# Patient Record
Sex: FEMALE | Race: BLACK OR AFRICAN AMERICAN | Employment: OTHER | URBAN - METROPOLITAN AREA
[De-identification: names, ages, dates, MRNs, and addresses within clinical notes are randomized per-mention and may not be internally consistent; named-entity substitution may affect disease eponyms.]

---

## 2018-12-13 ENCOUNTER — OFFICE VISIT (OUTPATIENT)
Dept: CARDIOLOGY CLINIC | Age: 78
End: 2018-12-13

## 2018-12-13 VITALS
BODY MASS INDEX: 23.56 KG/M2 | HEART RATE: 64 BPM | WEIGHT: 120 LBS | SYSTOLIC BLOOD PRESSURE: 120 MMHG | HEIGHT: 60 IN | DIASTOLIC BLOOD PRESSURE: 60 MMHG | OXYGEN SATURATION: 99 %

## 2018-12-13 DIAGNOSIS — I48.91 ATRIAL FIBRILLATION, UNSPECIFIED TYPE (HCC): ICD-10-CM

## 2018-12-13 DIAGNOSIS — R01.1 HEART MURMUR: Primary | ICD-10-CM

## 2018-12-13 RX ORDER — FUROSEMIDE 20 MG/1
20 TABLET ORAL
COMMUNITY

## 2018-12-13 RX ORDER — METOPROLOL SUCCINATE 100 MG/1
50 TABLET, EXTENDED RELEASE ORAL DAILY
COMMUNITY

## 2018-12-13 RX ORDER — CEFTRIAXONE SODIUM 2 G/1
2 INJECTION, POWDER, FOR SOLUTION INTRAVENOUS EVERY 24 HOURS
COMMUNITY
End: 2019-01-25

## 2018-12-13 NOTE — PROGRESS NOTES
Lala Elias    Chief Complaint   Patient presents with   [facility] New Patient     referred for A-fib and heart murmur - no cardiac complaints       HPI    Lala Elias is a 69 y.o. AAF with recent hospitalization, here to establish cardiovascular care. As you know, Ms. Livier Alcantar used to live alone in the [place] area. She was active and thought to be doing well. Her daughter came to visit her just before Thanksgiving with plans to bring her back to [facility] for a few weeks. Patient had been complaining of a cough so she went to her primary care provider who did a routine workup. Patient was incidentally found to be in atrial fibrillation with RVR 160s, had severe MR, and sepsis/ bacteremia. She was admitted to [facility] and apparently converted to NSR with Metoprolol. She had a 2D echocardiogram that showed severe MR due to prolapsed posterior leaflet (which was new when compared to prior echo from ~2014. Due to the bacteremia, patient had a JOSE (I do not have this report at the time of my evaluation) but daughter says there was no sign of vegetation and patient was in NSR so no cardioversion was attempted. Her LVEF was normal but she did have AECHF/ HFpEF and greatly improved fluid status with IV Lasix. She was discharged on BB, Xarelto, Lasix 3x per week, and Rocephin per PICC line. They have a home care nurse who helps with this and her Abx therapy will complete on 12/17/19 I believe. I independently obtained and reviewed records to get history above. Patient has h/o dementia which daughter says is mild. Patient is overall much improved, has no complaints, but hasnt been as active as they weren't sure how much to push her. She literally just came from [place] a few days ago so they are just getting her established with physicians in the area. Patient will live with her daughter here. No past medical history on file. - AF, severe MR, diastolic CHF, dementia    No past surgical history on file.     Current Outpatient Medications   Medication Sig Dispense Refill    cefTRIAXone (ROCEPHIN) 2 gram solr 2 g by IntraVENous route every twenty-four (24) hours.  furosemide (LASIX) 20 mg tablet Take  by mouth daily.  metoprolol succinate (TOPROL-XL) 100 mg tablet Take  by mouth daily.  rivaroxaban (XARELTO) 20 mg tab tablet Take  by mouth daily. No Known Allergies    Social History     Socioeconomic History    Marital status: SINGLE     Spouse name: Not on file    Number of children: Not on file    Years of education: Not on file    Highest education level: Not on file   Social Needs    Financial resource strain: Not on file    Food insecurity - worry: Not on file    Food insecurity - inability: Not on file    Transportation needs - medical: Not on file   [employer] needs - non-medical: Not on file   Occupational History    Not on file   Tobacco Use    Smoking status: Never Smoker    Smokeless tobacco: Never Used   Substance and Sexual Activity    Alcohol use: Not on file    Drug use: Not on file    Sexual activity: Not on file   Other Topics Concern    Not on file   Social History Narrative    Not on file        FH- noncontribuatory    Review of Systems    14 pt Review of Systems is negative unless otherwise mentioned in the HPI. LUE PICC line clotted off and needed to be moved to E. Usually steady/ denies falls. No CP, denies SOB, LE edema greatly improved     Wt Readings from Last 3 Encounters:   12/13/18 54.4 kg (120 lb)     Temp Readings from Last 3 Encounters:   No data found for Temp     BP Readings from Last 3 Encounters:   12/13/18 120/60     Pulse Readings from Last 3 Encounters:   12/13/18 64       Physical Exam:    Visit Vitals  /60   Pulse 64   Ht 5' (1.524 m)   Wt 54.4 kg (120 lb)   SpO2 99%   BMI 23.44 kg/m²      Physical Exam   Constitutional: She is oriented to person, place, and time. She appears well-developed and well-nourished. No distress.    HENT: Head: Normocephalic and atraumatic. Eyes: EOM are normal. Pupils are equal, round, and reactive to light. Neck: No JVD present. JVP just above clavicle with head about 45 degrees   Cardiovascular: Normal rate, regular rhythm and intact distal pulses. Exam reveals no gallop and no friction rub. Murmur heard. 5/6 BASIA loudest at apex but radiates throughout chest wall    Pulmonary/Chest: Effort normal and breath sounds normal. No respiratory distress. She has no wheezes. She has no rales. She exhibits no tenderness. Abdominal: Soft. Bowel sounds are normal.   Musculoskeletal: She exhibits no edema or tenderness. Trace b/l LE edema   Neurological: She is alert and oriented to person, place, and time. Skin: Skin is warm and dry. She is not diaphoretic. Psychiatric: She has a normal mood and affect. EKG today shows: NSR, normal axis and intervals, no ST segment abnormalities. PVC noted. Impression and Plan:  Lexie Search is a 69 y.o. with:    1.) asymptomatic paroxysmal nonvalvular atrial fibrillation, currently in NSR. ChadsVasc ~3-4  2.) s/p AECHF/ HFpEF in setting of AF RVR, resolved/ currently evolemic  3.) Sepsis/ Bacteremia, negative JOSE for endocarditis, vitals stable/ completing Abx therapy via PICC RUE  4.) Severe primary MR, known    1.) Continue Metoprolol for rate control strategy of AF  2.) Continue Xarelto for Stroke prophylaxis based on ChadsVasc Risk  3.) Continue Lasix prn, directions given to daughter regarding dosing  4.) Conservative mgt moving forward in regards to MR, patient absolutely not interested in any surgical options  5.) Cont Abx for infection, no signs of endocarditis warranting repeat cardiac imaging at this time  6.) RTC 3 months recheck    Significant time spent reviewing records as all of the above were new diagnoses/ findings for his patient who thought she was doing well. I reviewed the etiology and natural history of AF and MR in particular.  She is overall doing quite well despite recent events and should continue on current medical therapy. I did let her and her daughter know she is prone to get short of breath and have fluid overload from the MR (as well as AF)- so to keep a close eye. She knows the MR is progressive and cannot be \"fixed\" with medications but wants conservative treatment moving forward. Thank you for allowing me to participate in the care of your patient, please do not hesitate to call with questions or concerns. Follow-up Disposition:  Return in about 3 months (around 3/13/2019).     Brook Pratt, DO

## 2018-12-13 NOTE — PROGRESS NOTES
Ariel Odell presents today for   Chief Complaint   Patient presents with    New Patient     referred for A-fib and heart murmur       Ariel Odell preferred language for health care discussion is english/other. Is someone accompanying this pt? Yes, daughter     Is the patient using any DME equipment during OV? No    Depression Screening:  No flowsheet data found. Learning Assessment:  Learning Assessment 12/13/2018   PRIMARY LEARNER Patient   BARRIERS PRIMARY LEARNER NONE   CO-LEARNER CAREGIVER Yes   CO-LEARNER NAME Danika Vale   BARRIERS CO-LEARNER NONE   PRIMARY LANGUAGE ENGLISH   PRIMARY LANGUAGE CO-LEARNER ENGLISH    NEED No   LEARNER PREFERENCE PRIMARY READING   LEARNER PREFERENCE CO-LEARNER READING   ANSWERED BY patient   RELATIONSHIP SELF       Abuse Screening:  No flowsheet data found. Fall Risk  No flowsheet data found. Pt currently taking Antiplatelet therapy? Xarelto     Coordination of Care:  1. Have you been to the ER, urgent care clinic since your last visit? Hospitalized since your last visit? 12/8/2018 for DVT and Afib and 11/23/2018 for Afib with RVR    2. Have you seen or consulted any other health care providers outside of the [address] [place] [address] since your last visit? Include any pap smears or colon screening.  [employer]

## 2019-01-25 PROBLEM — K92.2 ACUTE LOWER GI BLEEDING: Status: ACTIVE | Noted: 2019-01-25

## 2019-03-06 ENCOUNTER — OFFICE VISIT (OUTPATIENT)
Dept: CARDIOLOGY CLINIC | Age: 79
End: 2019-03-06

## 2019-03-06 VITALS
BODY MASS INDEX: 23.75 KG/M2 | WEIGHT: 121 LBS | HEART RATE: 63 BPM | DIASTOLIC BLOOD PRESSURE: 72 MMHG | HEIGHT: 60 IN | SYSTOLIC BLOOD PRESSURE: 108 MMHG | OXYGEN SATURATION: 98 %

## 2019-03-06 DIAGNOSIS — R00.2 PALPITATION: Primary | ICD-10-CM

## 2019-03-06 NOTE — PROGRESS NOTES
Jailyn Reyes    Chief Complaint   Patient presents with    Follow-up     3 month follow up        HPI    Jailyn Reyes is a 69 y.o. AAF with AF, severe MR, and recent GIB here for routine follow up for her cardiovascular care. As you know, Ms. Rodrigo Coker used to live alone in the [place] area. She was active and thought to be doing well. Her daughter came to visit her just before Thanksgiving with plans to bring her back to [facility] for a few weeks. Patient had been complaining of a cough so she went to her primary care provider who did a routine workup. Patient was incidentally found to be in atrial fibrillation with RVR 160s, had severe MR, and sepsis/ bacteremia. She was admitted to [facility] and apparently converted to NSR with Metoprolol. She had a 2D echocardiogram that showed severe MR due to prolapsed posterior leaflet (which was new when compared to prior echo from ~2014. Due to the bacteremia, patient had a JOSE (I do not have this report at the time of my evaluation) but daughter says there was no sign of vegetation and patient was in NSR so no cardioversion was attempted. Her LVEF was normal but she did have AECHF/ HFpEF and greatly improved fluid status with IV Lasix. She was discharged on BB, Xarelto, Lasix 3x per week, and Rocephin per PICC line. They have a home care nurse who helps with this and her Abx therapy will complete on 12/17/19 I believe. I independently obtained and reviewed records to get history above. Patient has h/o dementia which daughter says is mild. Patient is overall much improved, has no complaints, but hasnt been as active as they weren't sure how much to push her. She had some recent lower GI bleeding was seen through [facility].  It sounds like she had a colonoscopy had a polyp removed and was also noted to have hemorrhoids per the daughter. She is back on Xarelto and otherwise doing well.     Past Medical History:   Diagnosis Date    Anemia     Atrial fibrillation (HCC)    - AF, severe MR, diastolic CHF, dementia    Past Surgical History:   Procedure Laterality Date    COLONOSCOPY N/A 1/26/2019    COLONOSCOPY, DIAGNOSTIC /c Bx /c Cold Snared Polypectomy performed by Gilda Currie MD at [facility] HX HYSTERECTOMY         Current Outpatient Medications   Medication Sig Dispense Refill    ferrous sulfate (IRON) 325 mg (65 mg iron) tablet Take 325 mg by mouth Daily (before breakfast).  furosemide (LASIX) 20 mg tablet Take 20 mg by mouth BID Mon Wed & Fri.      metoprolol succinate (TOPROL-XL) 100 mg tablet Take 50 mg by mouth daily.  rivaroxaban (XARELTO) 20 mg tab tablet Take 20 mg by mouth daily. No Known Allergies    Social History     Socioeconomic History    Marital status: SINGLE     Spouse name: Not on file    Number of children: Not on file    Years of education: Not on file    Highest education level: Not on file   Social Needs    Financial resource strain: Not on file    Food insecurity - worry: Not on file    Food insecurity - inability: Not on file    Transportation needs - medical: Not on file   [employer] needs - non-medical: Not on file   Occupational History    Not on file   Tobacco Use    Smoking status: Never Smoker    Smokeless tobacco: Never Used   Substance and Sexual Activity    Alcohol use: No     Frequency: Never    Drug use: No    Sexual activity: Not on file   Other Topics Concern    Not on file   Social History Narrative    Not on file        FH- noncontribuatory    Review of Systems    14 pt Review of Systems is negative unless otherwise mentioned in the HPI. LUE PICC line clotted off and needed to be moved to E. Usually steady/ denies falls.  No CP, denies SOB, LE edema greatly improved     Wt Readings from Last 3 Encounters:   03/06/19 54.9 kg (121 lb)   02/23/19 53.1 kg (117 lb 1 oz)   01/25/19 53.5 kg (118 lb)     Temp Readings from Last 3 Encounters:   02/23/19 97.8 °F (36.6 °C) 01/27/19 97.9 °F (36.6 °C)     BP Readings from Last 3 Encounters:   03/06/19 108/72   02/23/19 133/58   01/27/19 124/67     Pulse Readings from Last 3 Encounters:   03/06/19 63   02/23/19 79   01/27/19 63       Physical Exam:    Visit Vitals  /72   Pulse 63   Ht 5' (1.524 m)   Wt 54.9 kg (121 lb)   SpO2 98%   BMI 23.63 kg/m²      Physical Exam   Constitutional: She is oriented to person, place, and time. She appears well-developed and well-nourished. No distress. HENT:   Head: Normocephalic and atraumatic. Eyes: EOM are normal. Pupils are equal, round, and reactive to light. Neck: No JVD present. JVP just above clavicle with head about 45 degrees   Cardiovascular: Normal rate, regular rhythm and intact distal pulses. Exam reveals no gallop and no friction rub. Murmur heard. 5/6 BASIA loudest at apex but radiates throughout chest wall    Pulmonary/Chest: Effort normal and breath sounds normal. No respiratory distress. She has no wheezes. She has no rales. She exhibits no tenderness. Abdominal: Soft. Bowel sounds are normal.   Musculoskeletal: She exhibits no edema or tenderness. Trace b/l LE edema   Neurological: She is alert and oriented to person, place, and time. Skin: Skin is warm and dry. She is not diaphoretic. Psychiatric: She has a normal mood and affect. EKG today shows: NSR, normal axis and intervals, no ST segment abnormalities. PVC noted. Impression and Plan:  Deon Peabody is a 69 y.o. with:    1.) asymptomatic paroxysmal nonvalvular atrial fibrillation, currently in NSR.  ChadsVasc ~3-4  2.) s/p AECHF/ HFpEF in setting of AF RVR, resolved/ currently evolemic  3.) h/o Sepsis/ Bacteremia, negative JOSE for endocarditis, Abx therapy via PICC, known  4.) Severe primary MR, known  5.) Recent LGIB, stable     1.) Continue Metoprolol for rate control strategy of AF  2.) Continue Xarelto for Stroke prophylaxis based on ChadsVasc Risk  3.) Continue Lasix prn, directions given to daughter regarding dosing  4.) Conservative mgt moving forward in regards to MR, patient absolutely not interested in any surgical options  5.) RTC 6 months    She is overall doing quite well despite recent events and should continue on current medical therapy. I did let her and her daughter know she is prone to get short of breath and have fluid overload from the MR (as well as AF)- so to keep a close eye. She knows the MR is progressive and cannot be \"fixed\" with medications but wants conservative treatment moving forward. Thank you for allowing me to participate in the care of your patient, please do not hesitate to call with questions or concerns. Follow-up Disposition:  Return in about 6 months (around 9/6/2019).     Rafa Mesa, DO